# Patient Record
Sex: MALE | Race: OTHER | Employment: FULL TIME | URBAN - NONMETROPOLITAN AREA
[De-identification: names, ages, dates, MRNs, and addresses within clinical notes are randomized per-mention and may not be internally consistent; named-entity substitution may affect disease eponyms.]

---

## 2023-11-10 ENCOUNTER — HOSPITAL ENCOUNTER (OUTPATIENT)
Age: 33
Discharge: HOME | End: 2023-11-10
Payer: COMMERCIAL

## 2023-11-10 ENCOUNTER — OFFICE VISIT (OUTPATIENT)
Dept: FAMILY MEDICINE CLINIC | Age: 33
End: 2023-11-10

## 2023-11-10 VITALS — WEIGHT: 207 LBS

## 2023-11-10 DIAGNOSIS — Z11.1 SCREENING FOR TUBERCULOSIS: ICD-10-CM

## 2023-11-10 DIAGNOSIS — Z13.31 SCREENING FOR DEPRESSION: ICD-10-CM

## 2023-11-10 DIAGNOSIS — Z13.6 SCREENING FOR HYPERTENSION: ICD-10-CM

## 2023-11-10 DIAGNOSIS — Z13.29 SCREENING FOR HYPOTHYROIDISM: ICD-10-CM

## 2023-11-10 DIAGNOSIS — Z13.1 SCREENING FOR DIABETES MELLITUS: ICD-10-CM

## 2023-11-10 DIAGNOSIS — Z11.59 ENCOUNTER FOR HCV SCREENING TEST FOR LOW RISK PATIENT: ICD-10-CM

## 2023-11-10 DIAGNOSIS — Z13.220 SCREENING FOR HYPERLIPIDEMIA: ICD-10-CM

## 2023-11-10 DIAGNOSIS — Z00.00 ENCOUNTER FOR MEDICAL EXAMINATION TO ESTABLISH CARE: Primary | ICD-10-CM

## 2023-11-10 DIAGNOSIS — Z11.4 SCREENING FOR HIV (HUMAN IMMUNODEFICIENCY VIRUS): ICD-10-CM

## 2023-11-10 DIAGNOSIS — Z83.49 FAMILY HISTORY OF HYPOTHYROIDISM: ICD-10-CM

## 2023-11-10 LAB
ALBUMIN SERPL-MCNC: 4.7 G/DL (ref 3.5–5.2)
ALP SERPL-CCNC: 74 U/L (ref 40–129)
ALT SERPL-CCNC: 16 U/L (ref 5–41)
ANION GAP SERPL CALCULATED.3IONS-SCNC: 8 MMOL/L (ref 9–17)
AST SERPL-CCNC: 15 U/L
BASOPHILS # BLD: 0.03 K/UL (ref 0–0.2)
BASOPHILS NFR BLD: 0 % (ref 0–2)
BILIRUB SERPL-MCNC: 0.3 MG/DL (ref 0.3–1.2)
BUN SERPL-MCNC: 16 MG/DL (ref 6–20)
BUN/CREAT SERPL: 18 (ref 9–20)
CALCIUM SERPL-MCNC: 9.7 MG/DL (ref 8.6–10.4)
CHLORIDE SERPL-SCNC: 98 MMOL/L (ref 98–107)
CHOLEST SERPL-MCNC: 180 MG/DL
CHOLESTEROL/HDL RATIO: 4.5
CO2 SERPL-SCNC: 27 MMOL/L (ref 20–31)
CREAT SERPL-MCNC: 0.9 MG/DL (ref 0.7–1.2)
EOSINOPHIL # BLD: 0.2 K/UL (ref 0–0.4)
EOSINOPHILS RELATIVE PERCENT: 3 % (ref 0–5)
ERYTHROCYTE [DISTWIDTH] IN BLOOD BY AUTOMATED COUNT: 13.2 % (ref 12.1–15.2)
EST. AVERAGE GLUCOSE BLD GHB EST-MCNC: 111 MG/DL
GFR SERPL CREATININE-BSD FRML MDRD: >60 ML/MIN/1.73M2
GLUCOSE SERPL-MCNC: 103 MG/DL (ref 70–99)
HBA1C MFR BLD: 5.5 % (ref 4–6)
HCT VFR BLD AUTO: 44.6 % (ref 41–53)
HCV AB SERPL QL IA: NONREACTIVE
HDLC SERPL-MCNC: 40 MG/DL
HGB BLD-MCNC: 14.9 G/DL (ref 13.5–17.5)
HIV 1+2 AB+HIV1 P24 AG SERPL QL IA: NONREACTIVE
IMM GRANULOCYTES # BLD AUTO: 0.01 K/UL (ref 0–0.3)
IMM GRANULOCYTES NFR BLD: 0 % (ref 0–5)
LDLC SERPL CALC-MCNC: 125 MG/DL (ref 0–130)
LYMPHOCYTES NFR BLD: 1.47 K/UL (ref 1–4.8)
LYMPHOCYTES RELATIVE PERCENT: 21 % (ref 13–44)
MCH RBC QN AUTO: 27.4 PG (ref 26–34)
MCHC RBC AUTO-ENTMCNC: 33.4 G/DL (ref 31–37)
MCV RBC AUTO: 82.1 FL (ref 80–100)
MONOCYTES NFR BLD: 0.66 K/UL (ref 0–1)
MONOCYTES NFR BLD: 10 % (ref 5–9)
NEUTROPHILS NFR BLD: 66 % (ref 39–75)
NEUTS SEG NFR BLD: 4.61 K/UL (ref 2.1–6.5)
PLATELET # BLD AUTO: 276 K/UL (ref 140–450)
PMV BLD AUTO: 8.6 FL (ref 6–12)
POTASSIUM SERPL-SCNC: 3.9 MMOL/L (ref 3.7–5.3)
PROT SERPL-MCNC: 8 G/DL (ref 6.4–8.3)
RBC # BLD AUTO: 5.43 M/UL (ref 4.5–5.9)
SODIUM SERPL-SCNC: 133 MMOL/L (ref 135–144)
TRIGL SERPL-MCNC: 73 MG/DL
TSH SERPL DL<=0.05 MIU/L-ACNC: 1.97 UIU/ML (ref 0.3–5)
WBC OTHER # BLD: 7 K/UL (ref 3.5–11)

## 2023-11-10 PROCEDURE — 84443 ASSAY THYROID STIM HORMONE: CPT

## 2023-11-10 PROCEDURE — 83036 HEMOGLOBIN GLYCOSYLATED A1C: CPT

## 2023-11-10 PROCEDURE — 86480 TB TEST CELL IMMUN MEASURE: CPT

## 2023-11-10 PROCEDURE — 80053 COMPREHEN METABOLIC PANEL: CPT

## 2023-11-10 PROCEDURE — 86803 HEPATITIS C AB TEST: CPT

## 2023-11-10 PROCEDURE — 85025 COMPLETE CBC W/AUTO DIFF WBC: CPT

## 2023-11-10 PROCEDURE — 36415 COLL VENOUS BLD VENIPUNCTURE: CPT

## 2023-11-10 PROCEDURE — 87389 HIV-1 AG W/HIV-1&-2 AB AG IA: CPT

## 2023-11-10 PROCEDURE — 80061 LIPID PANEL: CPT

## 2023-11-10 ASSESSMENT — ENCOUNTER SYMPTOMS
SINUS PAIN: 0
DIARRHEA: 0
NAUSEA: 0
COUGH: 0
WHEEZING: 0
ABDOMINAL PAIN: 0
SORE THROAT: 0
BACK PAIN: 0
VOMITING: 0

## 2023-11-10 NOTE — PROGRESS NOTES
HPI Notes    Name: Doneta Hatchet  : 1990         Chief Complaint:     Chief Complaint   Patient presents with    New Patient     Establishing care    nocturnal sweating     Patient has been having night sweats since the Summer       History of Present Illness:        HPI    This is a 40-year-old man who primarily speaks [place]an presenting to establish care with a new primary care provider. Our interview was conducted using , their name was Rogelio Dunn. His principal concerns today include obtaining screening laboratory studies as well as to discuss nocturnal sweating, which has been ongoing for about 4 months, since the summer of this year. Dors is no other symptoms and denies fever, chills, changes in his weight, nausea, vomiting, diarrhea, rashes, recent sick contacts, cough, shortness of air. Past Medical History:     No past medical history on file. Reviewed all health maintenance requirements and ordered appropriate tests  Health Maintenance Due   Topic Date Due    Hepatitis B vaccine (1 of 3 - 3-dose series) Never done    COVID-19 Vaccine (1) Never done    Varicella vaccine (1 of 2 - 2-dose childhood series) Never done    Depression Screen  Never done    HIV screen  Never done    Hepatitis C screen  Never done    DTaP/Tdap/Td vaccine (1 - Tdap) Never done    Flu vaccine (1) Never done       Past Surgical History:     No past surgical history on file. Medications:       Prior to Admission medications    Not on File        Allergies:       Patient has no known allergies. Social History:     Tobacco:    has no history on file for tobacco use. Alcohol:      has no history on file for alcohol use. Drug Use:  has no history on file for drug use. Family History:     No family history on file. Review of Systems:     Review of Systems   Constitutional:  Negative for fever. HENT:  Negative for sinus pain, sneezing and sore throat. Respiratory:  Negative for cough and wheezing.

## 2023-11-12 LAB
QUANTI TB GOLD PLUS: NEGATIVE
QUANTI TB1 MINUS NIL: 0 IU/ML (ref 0–0.34)
QUANTI TB2 MINUS NIL: 0 IU/ML (ref 0–0.34)
QUANTIFERON MITOGEN: >10 IU/ML
QUANTIFERON NIL: 0.02 IU/ML